# Patient Record
Sex: MALE | Race: WHITE | NOT HISPANIC OR LATINO
[De-identification: names, ages, dates, MRNs, and addresses within clinical notes are randomized per-mention and may not be internally consistent; named-entity substitution may affect disease eponyms.]

---

## 2021-05-19 ENCOUNTER — NON-APPOINTMENT (OUTPATIENT)
Age: 56
End: 2021-05-19

## 2021-05-20 PROBLEM — Z00.00 ENCOUNTER FOR PREVENTIVE HEALTH EXAMINATION: Status: ACTIVE | Noted: 2021-05-20

## 2021-05-21 ENCOUNTER — APPOINTMENT (OUTPATIENT)
Dept: OTOLARYNGOLOGY | Facility: CLINIC | Age: 56
End: 2021-05-21
Payer: COMMERCIAL

## 2021-05-21 VITALS — BODY MASS INDEX: 28.49 KG/M2 | TEMPERATURE: 97 F | WEIGHT: 215 LBS | HEIGHT: 73 IN

## 2021-05-21 DIAGNOSIS — G47.9 SLEEP DISORDER, UNSPECIFIED: ICD-10-CM

## 2021-05-21 DIAGNOSIS — H90.A31 MIXED CONDUCTIVE AND SENSORINEURAL HEARING LOSS, UNILATERAL, RIGHT EAR WITH RESTRICTED HEARING ON THE  CONTRALATERAL SIDE: ICD-10-CM

## 2021-05-21 DIAGNOSIS — Z80.9 FAMILY HISTORY OF MALIGNANT NEOPLASM, UNSPECIFIED: ICD-10-CM

## 2021-05-21 DIAGNOSIS — Z72.89 OTHER PROBLEMS RELATED TO LIFESTYLE: ICD-10-CM

## 2021-05-21 DIAGNOSIS — H93.13 TINNITUS, BILATERAL: ICD-10-CM

## 2021-05-21 DIAGNOSIS — Z87.898 PERSONAL HISTORY OF OTHER SPECIFIED CONDITIONS: ICD-10-CM

## 2021-05-21 DIAGNOSIS — H90.A22 SENSORINEURAL HEARING LOSS, UNILATERAL, LEFT EAR, WITH RESTRICTED HEARING ON THE CONTRALATERAL SIDE: ICD-10-CM

## 2021-05-21 DIAGNOSIS — Z80.7 FAMILY HISTORY OF OTHER MALIGNANT NEOPLASMS OF LYMPHOID, HEMATOPOIETIC AND RELATED TISSUES: ICD-10-CM

## 2021-05-21 PROCEDURE — 92567 TYMPANOMETRY: CPT

## 2021-05-21 PROCEDURE — 99204 OFFICE O/P NEW MOD 45 MIN: CPT

## 2021-05-21 PROCEDURE — 92557 COMPREHENSIVE HEARING TEST: CPT

## 2021-05-21 PROCEDURE — 99072 ADDL SUPL MATRL&STAF TM PHE: CPT

## 2021-05-21 RX ORDER — FLUTICASONE PROPIONATE 50 MCG
50 SPRAY, SUSPENSION NASAL
Refills: 0 | Status: ACTIVE | COMMUNITY

## 2021-05-24 NOTE — HISTORY OF PRESENT ILLNESS
[de-identified] : Initial visit.\par His chief complaint is "ear ringing-off". He also complains of "snoring/ apnea".\par \par He reports that at baseline his hearing is not is acute as it should be. This is worse on the right the left. He denies any tinnitus or vestibular symptoms. He does not a history of chronic ear problems.\par \par He has a history of snoring. He had a sleep study in the past that demonstrated some level of apnea. He did not have further workup or treatment.

## 2021-05-24 NOTE — ASSESSMENT
[FreeTextEntry1] : #1- -mixed hearing loss. He has a age component as well as a conductive component in his right ear.\par I am going to send him for a CAT scan to rule out middle ear disease. I suspect he may have otosclerosis..\par \par #2-sleep disturbance eating. He will be scheduled for a home level to sleep study.

## 2021-05-24 NOTE — REVIEW OF SYSTEMS
[Patient Intake Form Reviewed] : Patient intake form was reviewed [Hearing Loss] : hearing loss [Ear Noises] : ear noises [Negative] : Heme/Lymph [de-identified] : Ear Pressure [de-identified] : Postnasal Drip